# Patient Record
Sex: FEMALE | Race: WHITE
[De-identification: names, ages, dates, MRNs, and addresses within clinical notes are randomized per-mention and may not be internally consistent; named-entity substitution may affect disease eponyms.]

---

## 2020-01-08 ENCOUNTER — HOSPITAL ENCOUNTER (INPATIENT)
Dept: HOSPITAL 96 - M.ERS | Age: 45
LOS: 6 days | Discharge: HOME | DRG: 871 | End: 2020-01-14
Attending: INTERNAL MEDICINE | Admitting: INTERNAL MEDICINE
Payer: COMMERCIAL

## 2020-01-08 VITALS — SYSTOLIC BLOOD PRESSURE: 85 MMHG | DIASTOLIC BLOOD PRESSURE: 53 MMHG

## 2020-01-08 VITALS — HEIGHT: 67.99 IN | BODY MASS INDEX: 34.75 KG/M2 | WEIGHT: 229.3 LBS

## 2020-01-08 DIAGNOSIS — J15.6: ICD-10-CM

## 2020-01-08 DIAGNOSIS — E05.90: ICD-10-CM

## 2020-01-08 DIAGNOSIS — K58.9: ICD-10-CM

## 2020-01-08 DIAGNOSIS — A41.9: Primary | ICD-10-CM

## 2020-01-08 DIAGNOSIS — D50.9: ICD-10-CM

## 2020-01-08 DIAGNOSIS — B96.20: ICD-10-CM

## 2020-01-08 DIAGNOSIS — M79.7: ICD-10-CM

## 2020-01-08 DIAGNOSIS — R00.0: ICD-10-CM

## 2020-01-08 DIAGNOSIS — Z82.49: ICD-10-CM

## 2020-01-08 DIAGNOSIS — Z79.899: ICD-10-CM

## 2020-01-08 DIAGNOSIS — N39.0: ICD-10-CM

## 2020-01-08 DIAGNOSIS — Z83.3: ICD-10-CM

## 2020-01-08 DIAGNOSIS — E11.9: ICD-10-CM

## 2020-01-08 DIAGNOSIS — R65.20: ICD-10-CM

## 2020-01-08 DIAGNOSIS — E88.09: ICD-10-CM

## 2020-01-08 DIAGNOSIS — D47.3: ICD-10-CM

## 2020-01-08 DIAGNOSIS — F32.9: ICD-10-CM

## 2020-01-08 DIAGNOSIS — D63.8: ICD-10-CM

## 2020-01-08 DIAGNOSIS — D17.71: ICD-10-CM

## 2020-01-08 LAB
%HYPO/RBC NFR BLD AUTO: (no result) %
ABSOLUTE BASOPHILS: 0.3 THOU/UL (ref 0–0.2)
ABSOLUTE EOSINOPHILS: 0.3 THOU/UL (ref 0–0.7)
ABSOLUTE MONOCYTES: 1.3 THOU/UL (ref 0–1.2)
ALBUMIN SERPL-MCNC: 2 G/DL (ref 3.4–5)
ALP SERPL-CCNC: 214 U/L (ref 46–116)
ALT SERPL-CCNC: 20 U/L (ref 30–65)
ANION GAP SERPL CALC-SCNC: 16 MMOL/L (ref 7–16)
ANISOCYTOSIS BLD QL SMEAR: (no result)
APAP SERPL-MCNC: < 2 UG/ML (ref 10–30)
AST SERPL-CCNC: 20 U/L (ref 15–37)
BACTERIA-REFLEX: (no result) /HPF
BASOPHILS NFR BLD AUTO: 1 %
BE: -10.1 MMOL/L
BILIRUB SERPL-MCNC: 2.5 MG/DL
BILIRUB UR-MCNC: (no result) MG/DL
BUN SERPL-MCNC: 19 MG/DL (ref 7–18)
CALCIUM SERPL-MCNC: 8.4 MG/DL (ref 8.5–10.1)
CHLORIDE SERPL-SCNC: 104 MMOL/L (ref 98–107)
CO2 SERPL-SCNC: 20 MMOL/L (ref 21–32)
COLOR UR: (no result)
CREAT SERPL-MCNC: 1.7 MG/DL (ref 0.6–1.3)
DOHLE BOD BLD QL SMEAR: (no result)
EOSINOPHIL NFR BLD: 1 %
GLUCOSE SERPL-MCNC: 96 MG/DL (ref 70–99)
GRANULOCYTES NFR BLD MANUAL: 89 %
HCT VFR BLD CALC: 23.6 % (ref 37–47)
HGB BLD-MCNC: 7.4 GM/DL (ref 12–15)
INR PPP: 1.6
KETONES UR STRIP-MCNC: (no result) MG/DL
LIPASE: 54 U/L (ref 73–393)
LYMPHOCYTES # BLD: 1.1 THOU/UL (ref 0.8–5.3)
LYMPHOCYTES NFR BLD AUTO: 4 %
MCH RBC QN AUTO: 20.1 PG (ref 26–34)
MCHC RBC AUTO-ENTMCNC: 31.3 G/DL (ref 28–37)
MCV RBC: 64.3 FL (ref 80–100)
MICROCYTES: (no result)
MONOCYTES NFR BLD: 5 %
MPV: 7.1 FL. (ref 7.2–11.1)
MUCUS: (no result) STRN/LPF
NEUTROPHILS # BLD: 23.4 THOU/UL (ref 1.6–8.1)
NUCLEATED RBCS: 0 /100WBC
OPIATES UR-MCNC: POSITIVE NG/ML
PCO2 BLD: 26.1 MMHG (ref 35–45)
PLATELET # BLD EST: (no result) 10*3/UL
PLATELET CLUMP BLD QL SMEAR: (no result)
PLATELET COUNT*: 595 THOU/UL (ref 150–400)
PO2 BLD: 93.4 MMHG (ref 75–100)
POIKILOCYTOSIS BLD QL SMEAR: (no result)
POTASSIUM SERPL-SCNC: 2.9 MMOL/L (ref 3.5–5.1)
PROT SERPL-MCNC: 7 G/DL (ref 6.4–8.2)
PROT UR QL STRIP: (no result)
PROTHROMBIN TIME: 16.2 SECONDS (ref 9.2–11.5)
RBC # BLD AUTO: 3.67 MIL/UL (ref 4.2–5)
RBC # UR STRIP: (no result) /UL
RBC #/AREA URNS HPF: (no result) /HPF (ref 0–2)
RDW-CV: 24.6 % (ref 10.5–14.5)
SALICYLATES SERPL-MCNC: < 2.8 MG/DL (ref 2.8–20)
SCHISTOCYTES BLD QL SMEAR: (no result)
SODIUM SERPL-SCNC: 140 MMOL/L (ref 136–145)
SP GR UR STRIP: >= 1.03 (ref 1–1.03)
SQUAMOUS: (no result) /LPF (ref 0–3)
TOXIC GRANULES BLD QL SMEAR: (no result)
URINE CLARITY: (no result)
URINE GLUCOSE-RANDOM: NEGATIVE
URINE LEUKOCYTES-REFLEX: (no result)
URINE NITRITE-REFLEX: NEGATIVE
URINE WBC-REFLEX: (no result) /HPF (ref 0–5)
UROBILINOGEN UR STRIP-ACNC: 1 E.U./DL (ref 0.2–1)
WBC # BLD AUTO: 26.3 THOU/UL (ref 4–11)

## 2020-01-09 VITALS — SYSTOLIC BLOOD PRESSURE: 114 MMHG | DIASTOLIC BLOOD PRESSURE: 60 MMHG

## 2020-01-09 VITALS — DIASTOLIC BLOOD PRESSURE: 84 MMHG | SYSTOLIC BLOOD PRESSURE: 156 MMHG

## 2020-01-09 VITALS — SYSTOLIC BLOOD PRESSURE: 136 MMHG | DIASTOLIC BLOOD PRESSURE: 72 MMHG

## 2020-01-09 VITALS — SYSTOLIC BLOOD PRESSURE: 107 MMHG | DIASTOLIC BLOOD PRESSURE: 60 MMHG

## 2020-01-09 VITALS — DIASTOLIC BLOOD PRESSURE: 66 MMHG | SYSTOLIC BLOOD PRESSURE: 116 MMHG

## 2020-01-09 VITALS — SYSTOLIC BLOOD PRESSURE: 107 MMHG | DIASTOLIC BLOOD PRESSURE: 68 MMHG

## 2020-01-09 VITALS — SYSTOLIC BLOOD PRESSURE: 137 MMHG | DIASTOLIC BLOOD PRESSURE: 67 MMHG

## 2020-01-09 VITALS — DIASTOLIC BLOOD PRESSURE: 70 MMHG | SYSTOLIC BLOOD PRESSURE: 124 MMHG

## 2020-01-09 VITALS — DIASTOLIC BLOOD PRESSURE: 69 MMHG | SYSTOLIC BLOOD PRESSURE: 113 MMHG

## 2020-01-09 VITALS — SYSTOLIC BLOOD PRESSURE: 113 MMHG | DIASTOLIC BLOOD PRESSURE: 61 MMHG

## 2020-01-09 VITALS — DIASTOLIC BLOOD PRESSURE: 75 MMHG | SYSTOLIC BLOOD PRESSURE: 128 MMHG

## 2020-01-09 VITALS — DIASTOLIC BLOOD PRESSURE: 90 MMHG | SYSTOLIC BLOOD PRESSURE: 129 MMHG

## 2020-01-09 VITALS — DIASTOLIC BLOOD PRESSURE: 92 MMHG | SYSTOLIC BLOOD PRESSURE: 136 MMHG

## 2020-01-09 VITALS — SYSTOLIC BLOOD PRESSURE: 134 MMHG | DIASTOLIC BLOOD PRESSURE: 72 MMHG

## 2020-01-09 VITALS — SYSTOLIC BLOOD PRESSURE: 122 MMHG | DIASTOLIC BLOOD PRESSURE: 67 MMHG

## 2020-01-09 VITALS — DIASTOLIC BLOOD PRESSURE: 76 MMHG | SYSTOLIC BLOOD PRESSURE: 113 MMHG

## 2020-01-09 VITALS — SYSTOLIC BLOOD PRESSURE: 148 MMHG | DIASTOLIC BLOOD PRESSURE: 80 MMHG

## 2020-01-09 VITALS — SYSTOLIC BLOOD PRESSURE: 124 MMHG | DIASTOLIC BLOOD PRESSURE: 67 MMHG

## 2020-01-09 VITALS — DIASTOLIC BLOOD PRESSURE: 69 MMHG | SYSTOLIC BLOOD PRESSURE: 138 MMHG

## 2020-01-09 VITALS — SYSTOLIC BLOOD PRESSURE: 133 MMHG | DIASTOLIC BLOOD PRESSURE: 78 MMHG

## 2020-01-09 VITALS — DIASTOLIC BLOOD PRESSURE: 62 MMHG | SYSTOLIC BLOOD PRESSURE: 120 MMHG

## 2020-01-09 VITALS — DIASTOLIC BLOOD PRESSURE: 71 MMHG | SYSTOLIC BLOOD PRESSURE: 119 MMHG

## 2020-01-09 VITALS — SYSTOLIC BLOOD PRESSURE: 130 MMHG | DIASTOLIC BLOOD PRESSURE: 72 MMHG

## 2020-01-09 LAB
ANION GAP SERPL CALC-SCNC: 11 MMOL/L (ref 7–16)
BUN SERPL-MCNC: 18 MG/DL (ref 7–18)
CALCIUM SERPL-MCNC: 8.2 MG/DL (ref 8.5–10.1)
CHLORIDE SERPL-SCNC: 107 MMOL/L (ref 98–107)
CO2 SERPL-SCNC: 20 MMOL/L (ref 21–32)
CREAT SERPL-MCNC: 1.2 MG/DL (ref 0.6–1.3)
GLUCOSE SERPL-MCNC: 200 MG/DL (ref 70–99)
HCT VFR BLD CALC: 22.1 % (ref 37–47)
HGB BLD-MCNC: 7.1 GM/DL (ref 12–15)
MAGNESIUM SERPL-MCNC: 1.4 MG/DL (ref 1.8–2.4)
MCH RBC QN AUTO: 20.2 PG (ref 26–34)
MCHC RBC AUTO-ENTMCNC: 31.9 G/DL (ref 28–37)
MCV RBC: 63.3 FL (ref 80–100)
MPV: 7.3 FL. (ref 7.2–11.1)
PLATELET COUNT*: 651 THOU/UL (ref 150–400)
POTASSIUM SERPL-SCNC: 3.6 MMOL/L (ref 3.5–5.1)
POTASSIUM SERPL-SCNC: 3.8 MMOL/L (ref 3.5–5.1)
RBC # BLD AUTO: 3.49 MIL/UL (ref 4.2–5)
RDW-CV: 24.5 % (ref 10.5–14.5)
SODIUM SERPL-SCNC: 138 MMOL/L (ref 136–145)
WBC # BLD AUTO: 21.3 THOU/UL (ref 4–11)

## 2020-01-09 NOTE — NUR
PT CARE ASSUMED AFTER REPORT. ASSESSMENTS COMPLETE. MAG AND POTASSIUM
REPLACEMENTS GIVEN. PT ST ON MONITOR. EVENTUALLY RATE INTO 'S WITH RR
IN THE 30'S. DR HOGUE NOTIFIED OF HR,RR, AND EDEMA. ORDERS RECEIVED FOR
LACTIC ACID, CBC, AND BMP AND D5NS DECREASED TO 50ML/H. MAX TEMP 100.8.  PRN
PAIN MEDICATION GIVEN PER PT REQUESTS. SLOWLY PROGRESSING TOWARDS GOALS.

## 2020-01-09 NOTE — NUR
ICU rounds: Pt lives at home alone. Pt mother supportive.  Pt alert and
oriented.  Pt SBA with mobility and ADLs at this time.  SW to continue to
follow to assist with safe dc planning and provide resources/referrals if
needs arise.

## 2020-01-09 NOTE — NUR
VITALS STABLE, AFEBRILE. DENIES PAIN. 50CC UOP, TEA COLOR. PT DENIES URGENCY,
DYSURIA. BMX1, SOME BLEEDING NOTED IN STOOL, PT REPORTS SHE IS ON HER PERIOD.
OTHERWISE UNEVENTFUL NIGHT. CALL LIGHT WITHIN REACH. WILL CONTINUE MONITORING.

## 2020-01-09 NOTE — EKG
[place] [address]
Phone:  [phone]                     ELECTROCARDIOGRAM REPORT      
_______________________________________________________________________________
 
Name:       BETTY JACKSON             Room:           [address]    ADM IN  
.R.#:  [id number]      Account #:      [id number]  
Admission:  20     Attend Phys:    Bernardino Henry MD 
Discharge:               Date of Birth:  [birth year]  
         Report #: [id number]
    [id number]
_______________________________________________________________________________
THIS REPORT FOR:  //name//                      
 
                         [facility] ED
                                       
Test Date:    2020               Test Time:    20:42:15
Pat Name:     BETTY JACKSON         Department:   
Patient ID:   [id number]            Room:         [facility]
Gender:       F                        Technician:   FL
:          1975               Requested By: Damion Charles
Order Number: [id number] MD:   Elliott Gomez
                                 Measurements
Intervals                              Axis          
Rate:         127                      P:            62
OK:           133                      QRS:          21
QRSD:         90                       T:            -4
QT:           316                                    
QTc:          460                                    
                           Interpretive Statements
Sinus tachycardia
Abnormal R-wave progression, early transition
Borderline T abnormalities, anterior leads
Baseline wander in lead(s) V1
No previous ECG available for comparison
 
Electronically Signed On 2020 13:41:00 CST by Elliott Gomez
[email or web address]
 
 
 
 
 
 
 
 
 
 
 
 
 
 
 
 
 
  <ELECTRONICALLY SIGNED>
                                           By: Elliott Gomez MD, [facility]      
  20     1341
D: 20   _____________________________________
T: 20   Elliott Gomez MD, [facility]        /EPI

## 2020-01-10 VITALS — DIASTOLIC BLOOD PRESSURE: 62 MMHG | SYSTOLIC BLOOD PRESSURE: 139 MMHG

## 2020-01-10 VITALS — DIASTOLIC BLOOD PRESSURE: 75 MMHG | SYSTOLIC BLOOD PRESSURE: 139 MMHG

## 2020-01-10 VITALS — DIASTOLIC BLOOD PRESSURE: 62 MMHG | SYSTOLIC BLOOD PRESSURE: 140 MMHG

## 2020-01-10 VITALS — DIASTOLIC BLOOD PRESSURE: 76 MMHG | SYSTOLIC BLOOD PRESSURE: 141 MMHG

## 2020-01-10 VITALS — DIASTOLIC BLOOD PRESSURE: 63 MMHG | SYSTOLIC BLOOD PRESSURE: 117 MMHG

## 2020-01-10 VITALS — DIASTOLIC BLOOD PRESSURE: 76 MMHG | SYSTOLIC BLOOD PRESSURE: 121 MMHG

## 2020-01-10 VITALS — DIASTOLIC BLOOD PRESSURE: 66 MMHG | SYSTOLIC BLOOD PRESSURE: 114 MMHG

## 2020-01-10 VITALS — SYSTOLIC BLOOD PRESSURE: 130 MMHG | DIASTOLIC BLOOD PRESSURE: 72 MMHG

## 2020-01-10 VITALS — DIASTOLIC BLOOD PRESSURE: 73 MMHG | SYSTOLIC BLOOD PRESSURE: 126 MMHG

## 2020-01-10 VITALS — SYSTOLIC BLOOD PRESSURE: 118 MMHG | DIASTOLIC BLOOD PRESSURE: 65 MMHG

## 2020-01-10 VITALS — DIASTOLIC BLOOD PRESSURE: 86 MMHG | SYSTOLIC BLOOD PRESSURE: 143 MMHG

## 2020-01-10 VITALS — DIASTOLIC BLOOD PRESSURE: 76 MMHG | SYSTOLIC BLOOD PRESSURE: 109 MMHG

## 2020-01-10 VITALS — SYSTOLIC BLOOD PRESSURE: 140 MMHG | DIASTOLIC BLOOD PRESSURE: 83 MMHG

## 2020-01-10 VITALS — SYSTOLIC BLOOD PRESSURE: 121 MMHG | DIASTOLIC BLOOD PRESSURE: 66 MMHG

## 2020-01-10 VITALS — SYSTOLIC BLOOD PRESSURE: 132 MMHG | DIASTOLIC BLOOD PRESSURE: 73 MMHG

## 2020-01-10 VITALS — SYSTOLIC BLOOD PRESSURE: 133 MMHG | DIASTOLIC BLOOD PRESSURE: 73 MMHG

## 2020-01-10 VITALS — DIASTOLIC BLOOD PRESSURE: 59 MMHG | SYSTOLIC BLOOD PRESSURE: 128 MMHG

## 2020-01-10 VITALS — DIASTOLIC BLOOD PRESSURE: 63 MMHG | SYSTOLIC BLOOD PRESSURE: 126 MMHG

## 2020-01-10 VITALS — SYSTOLIC BLOOD PRESSURE: 112 MMHG | DIASTOLIC BLOOD PRESSURE: 67 MMHG

## 2020-01-10 VITALS — DIASTOLIC BLOOD PRESSURE: 75 MMHG | SYSTOLIC BLOOD PRESSURE: 136 MMHG

## 2020-01-10 VITALS — SYSTOLIC BLOOD PRESSURE: 127 MMHG | DIASTOLIC BLOOD PRESSURE: 72 MMHG

## 2020-01-10 LAB
ABSOLUTE BASOPHILS: 0.1 THOU/UL (ref 0–0.2)
ABSOLUTE EOSINOPHILS: 0.1 THOU/UL (ref 0–0.7)
ABSOLUTE MONOCYTES: 1.4 THOU/UL (ref 0–1.2)
ANION GAP SERPL CALC-SCNC: 9 MMOL/L (ref 7–16)
BASOPHILS NFR BLD AUTO: 0.3 %
BUN SERPL-MCNC: 15 MG/DL (ref 7–18)
CALCIUM SERPL-MCNC: 8.6 MG/DL (ref 8.5–10.1)
CHLORIDE SERPL-SCNC: 108 MMOL/L (ref 98–107)
CO2 SERPL-SCNC: 21 MMOL/L (ref 21–32)
CREAT SERPL-MCNC: 1 MG/DL (ref 0.6–1.3)
EOSINOPHIL NFR BLD: 0.6 %
GLUCOSE SERPL-MCNC: 132 MG/DL (ref 70–99)
GRANULOCYTES NFR BLD MANUAL: 82.5 %
HCT VFR BLD CALC: 21.4 % (ref 37–47)
HCT VFR BLD CALC: 22.9 % (ref 37–47)
HGB BLD-MCNC: 6.7 GM/DL (ref 12–15)
HGB BLD-MCNC: 7.3 GM/DL (ref 12–15)
IRON SERPL-MCNC: < 5 UG/DL (ref 50–175)
LYMPHOCYTES # BLD: 2.3 THOU/UL (ref 0.8–5.3)
LYMPHOCYTES NFR BLD AUTO: 10.3 %
MCH RBC QN AUTO: 19.9 PG (ref 26–34)
MCHC RBC AUTO-ENTMCNC: 31.3 G/DL (ref 28–37)
MCV RBC: 63.4 FL (ref 80–100)
MONOCYTES NFR BLD: 6.3 %
MPV: 7 FL. (ref 7.2–11.1)
NEUTROPHILS # BLD: 18.4 THOU/UL (ref 1.6–8.1)
NUCLEATED RBCS: 0 /100WBC
PLATELET COUNT*: 663 THOU/UL (ref 150–400)
POTASSIUM SERPL-SCNC: 3.8 MMOL/L (ref 3.5–5.1)
RBC # BLD AUTO: 3.38 MIL/UL (ref 4.2–5)
RDW-CV: 25.2 % (ref 10.5–14.5)
SAO2 % BLD FROM PO2: 2 % (ref 20–39)
SODIUM SERPL-SCNC: 138 MMOL/L (ref 136–145)
WBC # BLD AUTO: 22.3 THOU/UL (ref 4–11)

## 2020-01-10 NOTE — NUR
ICU rounds: pt receiving blood transfusion. Stepfather at BS. Pt states she
will go to her mother's house upon dc. No plans for dc over the weekend

## 2020-01-10 NOTE — NUR
VITALS STABLE, AFEBRILE. PT SLEPT THROUGH THE NIGHT. NO BM, 200CC UOP.
COMPLAINS OF BACK PAIN. OTHERWISE UNEVENTFUL NIGHT. CALL LIGHT WITHIN REACH.
WILL CONTINUE MONITORING.

## 2020-01-10 NOTE — NUR
PT A&O x4. ECG SHOWS ST. I UNIT OF BLOOD TRANSFUSED. GETS UP TO THE BSC, STB
ASSIST. GOOD UOP. TOLERATING DIET. CT ABD/PELVIS DONE. REPORT GIVEN TO DANIEL VARGAS, TELE.

## 2020-01-11 VITALS — DIASTOLIC BLOOD PRESSURE: 60 MMHG | SYSTOLIC BLOOD PRESSURE: 146 MMHG

## 2020-01-11 VITALS — SYSTOLIC BLOOD PRESSURE: 128 MMHG | DIASTOLIC BLOOD PRESSURE: 60 MMHG

## 2020-01-11 VITALS — DIASTOLIC BLOOD PRESSURE: 79 MMHG | SYSTOLIC BLOOD PRESSURE: 151 MMHG

## 2020-01-11 VITALS — SYSTOLIC BLOOD PRESSURE: 107 MMHG | DIASTOLIC BLOOD PRESSURE: 57 MMHG

## 2020-01-11 VITALS — SYSTOLIC BLOOD PRESSURE: 128 MMHG | DIASTOLIC BLOOD PRESSURE: 63 MMHG

## 2020-01-11 VITALS — DIASTOLIC BLOOD PRESSURE: 76 MMHG | SYSTOLIC BLOOD PRESSURE: 148 MMHG

## 2020-01-11 VITALS — DIASTOLIC BLOOD PRESSURE: 59 MMHG | SYSTOLIC BLOOD PRESSURE: 125 MMHG

## 2020-01-11 LAB
ABSOLUTE BASOPHILS: 0 THOU/UL (ref 0–0.2)
ABSOLUTE EOSINOPHILS: 0.2 THOU/UL (ref 0–0.7)
ABSOLUTE MONOCYTES: 1.3 THOU/UL (ref 0–1.2)
ALBUMIN SERPL-MCNC: 1.3 G/DL (ref 3.4–5)
ALP SERPL-CCNC: 279 U/L (ref 46–116)
ALT SERPL-CCNC: 19 U/L (ref 30–65)
ANION GAP SERPL CALC-SCNC: 11 MMOL/L (ref 7–16)
AST SERPL-CCNC: 26 U/L (ref 15–37)
BASOPHILS NFR BLD AUTO: 0.3 %
BILIRUB SERPL-MCNC: 1.4 MG/DL
BUN SERPL-MCNC: 10 MG/DL (ref 7–18)
CALCIUM SERPL-MCNC: 8.3 MG/DL (ref 8.5–10.1)
CHLORIDE SERPL-SCNC: 106 MMOL/L (ref 98–107)
CO2 SERPL-SCNC: 23 MMOL/L (ref 21–32)
CREAT SERPL-MCNC: 0.9 MG/DL (ref 0.6–1.3)
EOSINOPHIL NFR BLD: 1 %
GLUCOSE SERPL-MCNC: 104 MG/DL (ref 70–99)
GRANULOCYTES NFR BLD MANUAL: 72.6 %
HCT VFR BLD CALC: 21.2 % (ref 37–47)
HGB BLD-MCNC: 6.9 GM/DL (ref 12–15)
LYMPHOCYTES # BLD: 2.6 THOU/UL (ref 0.8–5.3)
LYMPHOCYTES NFR BLD AUTO: 17.2 %
MCH RBC QN AUTO: 20.6 PG (ref 26–34)
MCHC RBC AUTO-ENTMCNC: 32.7 G/DL (ref 28–37)
MCV RBC: 63.2 FL (ref 80–100)
MONOCYTES NFR BLD: 8.9 %
MPV: 7 FL. (ref 7.2–11.1)
NEUTROPHILS # BLD: 11 THOU/UL (ref 1.6–8.1)
NUCLEATED RBCS: 0 /100WBC
PLATELET COUNT*: 553 THOU/UL (ref 150–400)
POTASSIUM SERPL-SCNC: 3.4 MMOL/L (ref 3.5–5.1)
PROT SERPL-MCNC: 6.2 G/DL (ref 6.4–8.2)
RBC # BLD AUTO: 3.36 MIL/UL (ref 4.2–5)
RDW-CV: 25.9 % (ref 10.5–14.5)
SODIUM SERPL-SCNC: 140 MMOL/L (ref 136–145)
WBC # BLD AUTO: 15.1 THOU/UL (ref 4–11)

## 2020-01-11 NOTE — NUR
ASSUMED PT CARE AT 0730. ASSESSMENT COMPLETED AS CHARTED. ABLE TO MAKE NEEDS
KNOWN. UP WITH SBA. IV FLUIDS STOPPED TODAY DUE TO HGB 6.9, NOTIFIED  WILL
REDRAW TOMORROW FOR NEW ORDERS IF NEEDED. PT SLEEPING MOST OF THE DAY. 2L O2.
PT COUGHING BUT NO SUPTUM NOTED. WILL CONTINUE TO MONITOR.

## 2020-01-11 NOTE — NUR
VITALS STABLE, AFEBRILE. PT SLEPT THROUGH THE NIGHT. COMPLAINS OF BACK PAIN.
HR 120s-130s THROUGH MOST OF THE NIGHT. PT DENIES CP/SOB/N/V. OTHERWISE
UNEVENTFUL NIGHT. CALL LIGHT WITHIN REACH. WILL CONTINUE MONITORING.

## 2020-01-12 VITALS — DIASTOLIC BLOOD PRESSURE: 68 MMHG | SYSTOLIC BLOOD PRESSURE: 128 MMHG

## 2020-01-12 VITALS — DIASTOLIC BLOOD PRESSURE: 69 MMHG | SYSTOLIC BLOOD PRESSURE: 137 MMHG

## 2020-01-12 VITALS — SYSTOLIC BLOOD PRESSURE: 153 MMHG | DIASTOLIC BLOOD PRESSURE: 81 MMHG

## 2020-01-12 VITALS — DIASTOLIC BLOOD PRESSURE: 63 MMHG | SYSTOLIC BLOOD PRESSURE: 123 MMHG

## 2020-01-12 VITALS — DIASTOLIC BLOOD PRESSURE: 80 MMHG | SYSTOLIC BLOOD PRESSURE: 147 MMHG

## 2020-01-12 LAB
HCT VFR BLD CALC: 21.3 % (ref 37–47)
HGB BLD-MCNC: 7 GM/DL (ref 12–15)
MCH RBC QN AUTO: 20.7 PG (ref 26–34)
MCHC RBC AUTO-ENTMCNC: 32.7 G/DL (ref 28–37)
MCV RBC: 63.3 FL (ref 80–100)
MPV: 6.9 FL. (ref 7.2–11.1)
PLATELET COUNT*: 549 THOU/UL (ref 150–400)
RBC # BLD AUTO: 3.37 MIL/UL (ref 4.2–5)
RDW-CV: 25.7 % (ref 10.5–14.5)
WBC # BLD AUTO: 12.4 THOU/UL (ref 4–11)

## 2020-01-12 NOTE — NUR
PT A+O X4. USES CALL LIGHT APPROPRIATELY TO GET UP TO THE BATHROOM. STEAADY
WITH STANDBY ASSIST. TRACING MILD ST ON MONITOR. NORCO @ HS EFFECTIVE FOR
PAIN. PRN COUGH MED EFFECTIVE- GIVEN X 2. RESTED THROUGH MOST OF NIGHT. CALL
LIGHT IN REACH. HOURLY ROUNDING FOR SAFETY.

## 2020-01-12 NOTE — NUR
ASSUMED PT CARE AT 0730. ASSESSMENT COMPLETED AS CHARTED. ABLE TO MAKE NEEDS
KNOWN. UP WITH SBA. RESTING IN BED MOST OF THE DAY. C/O COUGH AND PAIN AND
GAVE PRN FOR BOTH PER EMAR. MOM AT BEDSIDE MOST OF THE DAY. CALL LIGHT WITHIN
REACH. WILL CONTINUE TO MONITOR.

## 2020-01-13 VITALS — SYSTOLIC BLOOD PRESSURE: 143 MMHG | DIASTOLIC BLOOD PRESSURE: 64 MMHG

## 2020-01-13 VITALS — DIASTOLIC BLOOD PRESSURE: 57 MMHG | SYSTOLIC BLOOD PRESSURE: 134 MMHG

## 2020-01-13 VITALS — SYSTOLIC BLOOD PRESSURE: 140 MMHG | DIASTOLIC BLOOD PRESSURE: 63 MMHG

## 2020-01-13 VITALS — DIASTOLIC BLOOD PRESSURE: 63 MMHG | SYSTOLIC BLOOD PRESSURE: 145 MMHG

## 2020-01-13 VITALS — DIASTOLIC BLOOD PRESSURE: 53 MMHG | SYSTOLIC BLOOD PRESSURE: 133 MMHG

## 2020-01-13 LAB
%HYPO/RBC NFR BLD AUTO: (no result) %
ABSOLUTE EOSINOPHILS: 0.1 THOU/UL (ref 0–0.7)
ABSOLUTE MONOCYTES: 0.8 THOU/UL (ref 0–1.2)
ALBUMIN SERPL-MCNC: 1.4 G/DL (ref 3.4–5)
ALP SERPL-CCNC: 342 U/L (ref 46–116)
ALT SERPL-CCNC: 19 U/L (ref 30–65)
ANION GAP SERPL CALC-SCNC: 8 MMOL/L (ref 7–16)
ANISOCYTOSIS BLD QL SMEAR: (no result)
AST SERPL-CCNC: 36 U/L (ref 15–37)
BILIRUB SERPL-MCNC: 0.7 MG/DL
BUN SERPL-MCNC: 8 MG/DL (ref 7–18)
CALCIUM SERPL-MCNC: 7.9 MG/DL (ref 8.5–10.1)
CHLORIDE SERPL-SCNC: 105 MMOL/L (ref 98–107)
CO2 SERPL-SCNC: 27 MMOL/L (ref 21–32)
CREAT SERPL-MCNC: 0.8 MG/DL (ref 0.6–1.3)
EOSINOPHIL NFR BLD: 1 %
GLUCOSE SERPL-MCNC: 107 MG/DL (ref 70–99)
GRANULOCYTES NFR BLD MANUAL: 72 %
HCT VFR BLD CALC: 21.4 % (ref 37–47)
HGB BLD-MCNC: 7 GM/DL (ref 12–15)
LYMPHOCYTES # BLD: 1.8 THOU/UL (ref 0.8–5.3)
LYMPHOCYTES NFR BLD AUTO: 18 %
MCH RBC QN AUTO: 20.6 PG (ref 26–34)
MCHC RBC AUTO-ENTMCNC: 32.4 G/DL (ref 28–37)
MCV RBC: 63.4 FL (ref 80–100)
METAMYELOCYTES NFR BLD: 1 %
MICROCYTES: (no result)
MONOCYTES NFR BLD: 8 %
MPV: 7.1 FL. (ref 7.2–11.1)
NEUTROPHILS # BLD: 7.2 THOU/UL (ref 1.6–8.1)
NUCLEATED RBCS: 0 /100WBC
PLATELET # BLD EST: (no result) 10*3/UL
PLATELET COUNT*: 520 THOU/UL (ref 150–400)
POTASSIUM SERPL-SCNC: 3.2 MMOL/L (ref 3.5–5.1)
PROT SERPL-MCNC: 6.6 G/DL (ref 6.4–8.2)
RBC # BLD AUTO: 3.38 MIL/UL (ref 4.2–5)
RDW-CV: 26.2 % (ref 10.5–14.5)
SODIUM SERPL-SCNC: 140 MMOL/L (ref 136–145)
WBC # BLD AUTO: 9.8 THOU/UL (ref 4–11)

## 2020-01-13 NOTE — NUR
ASSUMED CARE AT APPROX 1930. PT IS AWAKE AND ORIENTED X4. VSS ON 2L OF O2/NC.
CARDIAC MONITOR IN PLACE TRACING SR/ST. ASSESSMENT DONE AND CHARTED. PT C/O
BACK PAIN THAT IS CHRONIC, PARTIALLY RELIEVED BY PAIN MEDS GIVEN PER MAR. PT
IS ABLE TO SLEEP MOST OF THE NIGHT. DENIES DIZZINESS/LIGHTHEADEDNESS. CALL
LIGHT WITHIN REACH. HIGH FALL PRECAUTIONS IN PLACE. HOURLY ROUNDING DONE FOR
PT SAFETY.

## 2020-01-13 NOTE — NUR
CONTINUE TO FOLLOW, MET WITH PT AND MOM.  ENCOURAGED PT TO BE UP MORE.  PT
STILL PLANS TO GO TO HER MOM'S AT [place].  PT FOLLOWS AT THE [facility]
[place] AND STATES SHE APPLIED FOR MEDICAID LAST WEEK PRIOR TO ADMIT.  DENIES
OTHER NEEDS. WILL FOLLOW

## 2020-01-13 NOTE — CON
[address]  [address]                    CONSULTATION                  
_______________________________________________________________________________
 
Name:       RENETTATESSABETTY BRENTON             Room:           [address] [place]  
M.R.#:  [id number]      Account #:      [id number]  
Admission:  01/08/20     Attend Phys:    Bernardino Henry MD 
Discharge:               Date of Birth:  [birth year]  
         Report #: [id number]
                                                                     [id number]  
_______________________________________________________________________________
THIS REPORT FOR:  //name//                      
 
CC: Maddi Piyush Henry
 
DATE OF SERVICE:  01/10/2020
 
 
INFECTIOUS DISEASE CONSULTATION
 
ATTENDING PHYSICIAN:  Adonay Brooks MD
 
REASON FOR EVALUATION:  Complicated urinary tract infection and likely
pyelonephritis in the setting of fairly recently diagnosed diabetes mellitus.
 
HISTORY OF PRESENT ILLNESS:  Chart reviewed, patient examined.  This is a
44-year-old again with known diabetes mellitus diagnosed in 11/2019.  She was
admitted in different facility, was diagnosed with diabetic ketoacidosis, noted
blood sugars were greater than 10,000.  She has had fibromyalgia as well.  She
had been home, at that time, was found to have complicated UTI with Escherichia
coli, extended-spectrum beta-lactamase production.  She received parenteral
therapy 2 week post discharge, I believe with ertapenem.  She presented with
several syncopal episodes.  It is not clear that she had fevers, has some
chills.  She has generalized myalgias and arthralgias, although she states that
her baseline did have some diminished oral intake generally.  On evaluation, was
found to have marked lactic acidemia up to 7, most recent was 1.9.  Actually has
profound anemia too with hemoglobin at 6.7, pending transfusion.  She is lucid
at this point.  She appears ill, not overtly toxic.  Hemodynamically, she is
tachycardic, although blood pressure is recently stable.  Saturations are
adequate on supplemental oxygen per nasal cannula.
 
ALLERGIES:  None known.
 
MEDICATIONS:  Include levofloxacin, insulin lispro, metformin, p.r.n. analgesics
and antiemetics.
 
PAST MEDICAL HISTORY:  Recent diagnosis of diabetes mellitus type 2 with a
presentation with diabetic ketoacidosis, fibromyalgia, previous history of
tonsillectomy, oophorectomy, anxiety, depression.
 
SOCIAL HISTORY:  Nonsmoker, no ethanol, no illicit drug use.
 
FAMILY HISTORY:  Noncontributory.
 
REVIEW OF SYSTEMS:  As above, otherwise unremarkable.
 
 
 
 
[place] [address]                    CONSULTATION                  
_______________________________________________________________________________
 
Name:       BETTY JACKSON             Room:           [address]#:  [id number]      Account #:      [id number]  
Admission:  01/08/20     Attend Phys:    Bernardino Henry MD 
Discharge:               Date of Birth:  [birth year]  
         Report #: [id number]
                                                                     [id number]  
_______________________________________________________________________________
PHYSICAL EXAMINATION:
GENERAL:  She appears ill, not overtly toxic.  She has somewhat of a flat
affect, is mildly undernourished.
VITAL SIGNS:  Temperature 99, pulse 124, respirations 30s, blood pressure is
132/73.
SKIN:  Warm, dry.
HEENT:  Normocephalic.  Extraocular muscles intact.
NECK:  Supple.
LUNGS:  Diminished breath sounds.
HEART:  Regular, tachycardic.  I do not appreciate a murmur.
ABDOMEN:  Soft, nontender, nondistended.
EXTREMITIES:  No cyanosis.
GENITOURINARY:  Deferred.
 
LABORATORY DATA:  Initial studies, troponin less than 0.06.  PT is 16.2, INR
1.6, D-dimer of 2.03.  Electrolytes:  Sodium 140, potassium 2.9, chloride 104,
bicarbonate is 20, anion gap of 16, BUN and creatinine 19 and 1.7, total
bilirubin of 2.5, albumin of 2.0, total protein 7.0.  Recent studies showed
sodium 138, creatinine 1.0.  Estimated GFR of 61.  CBC initially white count
26.3, H and H 7.4 and 23.6, platelets of 595.  Repeat white count this morning
22.3, H and H 6.7 and 21.4, platelets of 663, likely acute phase reactant.  TSH
less than 0.007.  Blood cultures sterile thus far.  Urinalysis showed greater
than 25 white cells, greater than 30 bacteria.  Chest x-ray:  Mild patchy medial
bibasilar atelectasis versus infiltrate.  Influenza antigen was negative.  Drug
screen was noted for opiates.
 
ASSESSMENT AND PLAN:  Complicated urinary tract infection with a recent history
of same.  We will adjust antimicrobial therapy based on previous isolation of
multiple resistant gram-negative.  She is not aware of previous imaging studies.
 We will pursue that as well to exclude a complicating factor such as
obstructive etiology.  She may certainly have a degree of pneumonitis as well. 
She should have adequate coverage based on broad-spectrum approach.  Not sure I
can explain the profound anemia at this point, seems to have marked
hyperthyroidism as well and then apathetic nature.  We will need endocrine
evaluation.  At this point, she is not overtly toxic, monitor expectantly.
 
 
 
 
 
 
 
 
 
<ELECTRONICALLY SIGNED>
                                        By:  Kimo Cummins MD           
01/13/20     0736
D: 01/10/20 1126_______________________________________
T: 01/11/20 0113Jokody Cummins MD              /nt

## 2020-01-13 NOTE — NUR
ASSUMED PT CARE AT 0730. ASSESSMENT COMPLETED AS CHARTED. ABLE TO MAKE NEEDS
KNOWN. RESTING IN BED MOST OF THE DAY. C/O BACK PAIN AND GAVE PRN PAIN
MEDICATION PER EMAR. UP WITH SBA. WILL CONTINUE TO MONITOR.

## 2020-01-14 VITALS — DIASTOLIC BLOOD PRESSURE: 69 MMHG | SYSTOLIC BLOOD PRESSURE: 136 MMHG

## 2020-01-14 VITALS — DIASTOLIC BLOOD PRESSURE: 64 MMHG | SYSTOLIC BLOOD PRESSURE: 141 MMHG

## 2020-01-14 VITALS — SYSTOLIC BLOOD PRESSURE: 135 MMHG | DIASTOLIC BLOOD PRESSURE: 64 MMHG

## 2020-01-14 VITALS — DIASTOLIC BLOOD PRESSURE: 73 MMHG | SYSTOLIC BLOOD PRESSURE: 148 MMHG

## 2020-01-14 NOTE — NUR
ORDERS NOTED FOR DC, PT IN NEED OF ENDO F/U.  DISCUSSED WITH PT AND MOM.  MOM
WILL ASSIST PT WITH GETTING HER SCRIPT FILLED.  AGREEABLE TO REF TO [facility] FOR ENDO F/U IF ACCEPTED.  SUBMITTED REFERRAL ONLINE AND FAXED
CLINICAL -[phone]

## 2020-01-14 NOTE — NUR
ASSUMED PT CARE AT APPROX 1930. PT IS AWAKE AND ORIENTED X4. VSS ON ROOM AIR.
CARDIAC MONITOR IN PLACE TRACING SR/ST. ASSESSMENT DONE AND CHARTED. PT IS
ABLE TO SLEEP MOST OF THE NIGHT. HIGH FALL PRECAUTIONS IN PLACE. CALL LIGHT
WITHIN REACH. HOURLY ROUNDING DONE FOR PT SAFETY.